# Patient Record
Sex: MALE | Race: OTHER | Employment: STUDENT | URBAN - METROPOLITAN AREA
[De-identification: names, ages, dates, MRNs, and addresses within clinical notes are randomized per-mention and may not be internally consistent; named-entity substitution may affect disease eponyms.]

---

## 2019-08-02 ENCOUNTER — HOSPITAL ENCOUNTER (OUTPATIENT)
Age: 24
Setting detail: OBSERVATION
Discharge: HOME | End: 2019-08-03
Attending: EMERGENCY MEDICINE | Admitting: SPECIALIST
Payer: MEDICAID

## 2019-08-02 DIAGNOSIS — I47.1 PAROXYSMAL SVT (SUPRAVENTRICULAR TACHYCARDIA) (HCC): Primary | ICD-10-CM

## 2019-08-02 LAB
ALBUMIN SERPL-MCNC: 3.8 G/DL (ref 3.5–5)
ALBUMIN/GLOB SERPL: 1.2 {RATIO} (ref 1.1–2.2)
ALP SERPL-CCNC: 436 U/L (ref 45–117)
ALT SERPL-CCNC: 38 U/L (ref 12–78)
ANION GAP SERPL CALC-SCNC: 7 MMOL/L (ref 5–15)
AST SERPL-CCNC: 26 U/L (ref 15–37)
ATRIAL RATE: 625 BPM
ATRIAL RATE: 91 BPM
BASOPHILS # BLD: 0 K/UL (ref 0–0.1)
BASOPHILS NFR BLD: 0 % (ref 0–1)
BILIRUB SERPL-MCNC: 0.9 MG/DL (ref 0.2–1)
BUN SERPL-MCNC: 21 MG/DL (ref 6–20)
BUN/CREAT SERPL: 19 (ref 12–20)
CALCIUM SERPL-MCNC: 9.2 MG/DL (ref 8.5–10.1)
CALCULATED P AXIS, ECG09: 62 DEGREES
CALCULATED R AXIS, ECG10: -41 DEGREES
CALCULATED R AXIS, ECG10: 84 DEGREES
CALCULATED T AXIS, ECG11: 1 DEGREES
CALCULATED T AXIS, ECG11: 108 DEGREES
CHLORIDE SERPL-SCNC: 103 MMOL/L (ref 97–108)
CO2 SERPL-SCNC: 27 MMOL/L (ref 21–32)
COMMENT, HOLDF: NORMAL
CREAT SERPL-MCNC: 1.08 MG/DL (ref 0.7–1.3)
DIAGNOSIS, 93000: NORMAL
DIAGNOSIS, 93000: NORMAL
DIFFERENTIAL METHOD BLD: NORMAL
EOSINOPHIL # BLD: 0 K/UL (ref 0–0.4)
EOSINOPHIL NFR BLD: 0 % (ref 0–7)
ERYTHROCYTE [DISTWIDTH] IN BLOOD BY AUTOMATED COUNT: 12.1 % (ref 11.5–14.5)
GLOBULIN SER CALC-MCNC: 3.1 G/DL (ref 2–4)
GLUCOSE SERPL-MCNC: 107 MG/DL (ref 65–100)
HCT VFR BLD AUTO: 49.7 % (ref 36.6–50.3)
HGB BLD-MCNC: 17 G/DL (ref 12.1–17)
IMM GRANULOCYTES # BLD AUTO: 0 K/UL (ref 0–0.04)
IMM GRANULOCYTES NFR BLD AUTO: 0 % (ref 0–0.5)
LYMPHOCYTES # BLD: 2 K/UL (ref 0.8–3.5)
LYMPHOCYTES NFR BLD: 22 % (ref 12–49)
MAGNESIUM SERPL-MCNC: 2.1 MG/DL (ref 1.6–2.4)
MCH RBC QN AUTO: 32.4 PG (ref 26–34)
MCHC RBC AUTO-ENTMCNC: 34.2 G/DL (ref 30–36.5)
MCV RBC AUTO: 94.7 FL (ref 80–99)
MONOCYTES # BLD: 0.7 K/UL (ref 0–1)
MONOCYTES NFR BLD: 7 % (ref 5–13)
NEUTS SEG # BLD: 6.3 K/UL (ref 1.8–8)
NEUTS SEG NFR BLD: 71 % (ref 32–75)
NRBC # BLD: 0 K/UL (ref 0–0.01)
NRBC BLD-RTO: 0 PER 100 WBC
P-R INTERVAL, ECG05: 162 MS
PLATELET # BLD AUTO: 205 K/UL (ref 150–400)
PMV BLD AUTO: 9.5 FL (ref 8.9–12.9)
POTASSIUM SERPL-SCNC: 4 MMOL/L (ref 3.5–5.1)
PROT SERPL-MCNC: 6.9 G/DL (ref 6.4–8.2)
Q-T INTERVAL, ECG07: 310 MS
Q-T INTERVAL, ECG07: 378 MS
QRS DURATION, ECG06: 134 MS
QRS DURATION, ECG06: 96 MS
QTC CALCULATION (BEZET), ECG08: 464 MS
QTC CALCULATION (BEZET), ECG08: 536 MS
RBC # BLD AUTO: 5.25 M/UL (ref 4.1–5.7)
SAMPLES BEING HELD,HOLD: NORMAL
SODIUM SERPL-SCNC: 137 MMOL/L (ref 136–145)
TROPONIN I SERPL-MCNC: 0.05 NG/ML
TROPONIN I SERPL-MCNC: 0.09 NG/ML
VENTRICULAR RATE, ECG03: 180 BPM
VENTRICULAR RATE, ECG03: 91 BPM
WBC # BLD AUTO: 9.1 K/UL (ref 4.1–11.1)

## 2019-08-02 PROCEDURE — 74011250636 HC RX REV CODE- 250/636: Performed by: EMERGENCY MEDICINE

## 2019-08-02 PROCEDURE — 96365 THER/PROPH/DIAG IV INF INIT: CPT

## 2019-08-02 PROCEDURE — 96361 HYDRATE IV INFUSION ADD-ON: CPT

## 2019-08-02 PROCEDURE — 85025 COMPLETE CBC W/AUTO DIFF WBC: CPT

## 2019-08-02 PROCEDURE — 99218 HC RM OBSERVATION: CPT

## 2019-08-02 PROCEDURE — 80053 COMPREHEN METABOLIC PANEL: CPT

## 2019-08-02 PROCEDURE — 74011000258 HC RX REV CODE- 258: Performed by: EMERGENCY MEDICINE

## 2019-08-02 PROCEDURE — 93005 ELECTROCARDIOGRAM TRACING: CPT

## 2019-08-02 PROCEDURE — 36415 COLL VENOUS BLD VENIPUNCTURE: CPT

## 2019-08-02 PROCEDURE — 84484 ASSAY OF TROPONIN QUANT: CPT

## 2019-08-02 PROCEDURE — 83735 ASSAY OF MAGNESIUM: CPT

## 2019-08-02 PROCEDURE — 77030022643 HC PAD DEFIB AD HRTSTRT PHL -B

## 2019-08-02 PROCEDURE — 74011250636 HC RX REV CODE- 250/636: Performed by: SPECIALIST

## 2019-08-02 PROCEDURE — 99285 EMERGENCY DEPT VISIT HI MDM: CPT

## 2019-08-02 RX ORDER — SODIUM CHLORIDE 9 MG/ML
75 INJECTION, SOLUTION INTRAVENOUS CONTINUOUS
Status: DISCONTINUED | OUTPATIENT
Start: 2019-08-02 | End: 2019-08-03 | Stop reason: HOSPADM

## 2019-08-02 RX ORDER — PROCAINAMIDE HYDROCHLORIDE 500 MG/ML
100 INJECTION INTRAMUSCULAR; INTRAVENOUS
Status: DISCONTINUED | OUTPATIENT
Start: 2019-08-02 | End: 2019-08-02 | Stop reason: ALTCHOICE

## 2019-08-02 RX ADMIN — SODIUM CHLORIDE 1000 ML: 900 INJECTION, SOLUTION INTRAVENOUS at 17:21

## 2019-08-02 RX ADMIN — SODIUM CHLORIDE 75 ML/HR: 900 INJECTION, SOLUTION INTRAVENOUS at 21:00

## 2019-08-02 RX ADMIN — DEXTROSE MONOHYDRATE 20 MG/MIN: 5 INJECTION, SOLUTION INTRAVENOUS at 18:14

## 2019-08-02 NOTE — ROUTINE PROCESS
TRANSFER - OUT REPORT: 
 
Verbal report given to Miguel Merrill RN (name) on Mark Gardner  being transferred to Room 458(unit) for routine progression of care Report consisted of patients Situation, Background, Assessment and  
Recommendations(SBAR). Information from the following report(s) SBAR, Kardex, ED Summary, MAR, Recent Results and Cardiac Rhythm NSR was reviewed with the receiving nurse. Lines:  
Peripheral IV 08/02/19 Left Antecubital (Active) Site Assessment Clean, dry, & intact 8/2/2019  5:01 PM  
Phlebitis Assessment 0 8/2/2019  5:01 PM  
Infiltration Assessment 0 8/2/2019  5:01 PM  
Dressing Status Clean, dry, & intact 8/2/2019  5:01 PM  
   
Peripheral IV 08/02/19 Right Antecubital (Active) Site Assessment Clean, dry, & intact 8/2/2019  5:01 PM  
Phlebitis Assessment 0 8/2/2019  5:01 PM  
Infiltration Assessment 0 8/2/2019  5:01 PM  
Dressing Status Clean, dry, & intact 8/2/2019  5:01 PM  
  
 
Opportunity for questions and clarification was provided. Patient transported with: 
 [facility]

## 2019-08-02 NOTE — PROGRESS NOTES
Admission Medication Reconciliation:    Information obtained from:  patient, chart review    Comments/Recommendations: All medications/allergies have been reviewed and updated. The patient denies taking any medications including prescription or over the counter. Insurance information confirmed that the patient has not filled any medications within the past 6 months. Changes made to Prior to Admission (PTA) Medication List:   ?   Medications Added:   - None   ? Medications Changed:   - None   ? Medications Removed:   - None       Significant PMH/Disease States:   History reviewed. No pertinent past medical history. Chief Complaint for this Admission:    Chief Complaint   Patient presents with    Irregular Heart Beat       Allergies:  Patient has no known allergies. Prior to Admission Medications:   None     Thank you for allowing pharmacy to participate in the coordination of this patient's care. If you have any other questions, please contact the medication reconciliation pharmacist at [phone]. Cait Rivero, Pharm. D., BCPS

## 2019-08-02 NOTE — H&P
Date of  Admission: 8/2/2019  4:49 PM     Holland Leahy is a 27 y.o. male admitted for psvt    Subjective: Patient with no remarkable past medical history presents to the emergency room with complaint of palpitations and mild shortness of breath and dizziness. This is the third episode of palpitations. The first episode occurred approximately 4 weeks ago. The first 2 episodes lasted approximately 4 to 5 hours. This has been ongoing since 8:00 this a.m. No presyncopal syncopal episodes are reported. The patient denies any illicit drug use or energy drinks use. He tells me there is no family history remarkable for early coronary events or sudden cardiac death. Assessment/Plan: PSVT. I suspect this is  PSVT with aberrancy and I suspect there is underlying Abbie-Parkinson-White syndrome. I cannot entirely be sure that he does not have any underlying atrial flutter at this time. For this reason we will proceed with procainamide intravenously. I have discussed with the patient and his mother potential side effects of recurrences with WPW. Risk of ventricular tachycardia ventricular fibrillation been discussed and possibility although remote of required defibrillation is been also discussed. Obtain electrolytes and replete as necessary keep potassium above 4 magnesium 2 and above. Admit to observation overnight. On account of potential for underlying atrial flutter associated with WPW we will consider discharging the patient on p.o. amiodarone temporarily. Obtain echo in am    As I have discussed with the patient and his mother you will likely need to proceed with ablation in the future. If the patient is stable in the morning with no recurrence of PSVT then will discharge home with follow-up with Dr. Missy Feliz electrophysiology. There are no active problems to display for this patient. No primary care provider on file. History reviewed. No pertinent past medical history.    History reviewed. No pertinent surgical history. No Known Allergies   History reviewed. No pertinent family history. Current Facility-Administered Medications   Medication Dose Route Frequency    procainamide (PRONESTYL) 1,000 mg in dextrose 5% 250 mL infusion  20 mg/min IntraVENous CONTINUOUS     No current outpatient medications on file. Review of Symptoms:  Pertinent items are noted in HPI. Physical Exam    Visit Vitals  /67   Pulse (!) 176   Temp 98.2 °F (36.8 °C)   Resp 26   Ht 5' 11\" (1.803 m)   Wt 183 lb 3.2 oz (83.1 kg)   SpO2 100%   BMI 25.55 kg/m²     Visit Vitals  /61   Pulse (!) 176   Temp 98.2 °F (36.8 °C)   Resp 30   Ht 5' 11\" (1.803 m)   Wt 183 lb 3.2 oz (83.1 kg)   SpO2 100%   BMI 25.55 kg/m²     General Appearance:  Well developed, well nourished,alert and oriented x 3, and individual in no acute distress. Ears/Nose/Mouth/Throat:   Hearing grossly normal.         Neck: Supple. Chest:   Lungs clear to auscultation bilaterally. Cardiovascular:  tachycardic S1, S2 normal, no murmur. Abdomen:   Soft, non-tender, bowel sounds are active. Extremities: No edema bilaterally. Skin: Warm and dry.                Cardiographics    Telemetry: SVT  ECG: SVT with likely aberrancy  Echocardiogram: Not done    Recent radiology, intake/output and wt reviewed    Labs:   Recent Results (from the past 24 hour(s))   EKG, 12 LEAD, INITIAL    Collection Time: 08/02/19  4:56 PM   Result Value Ref Range    Ventricular Rate 180 BPM    Atrial Rate 625 BPM    QRS Duration 134 ms    Q-T Interval 310 ms    QTC Calculation (Bezet) 536 ms    Calculated R Axis -41 degrees    Calculated T Axis 108 degrees    Diagnosis       Wide QRS tachycardia  Left axis deviation  Right bundle branch block  Left ventricular hypertrophy with repolarization abnormality  No previous ECGs available     CBC WITH AUTOMATED DIFF    Collection Time: 08/02/19  4:59 PM   Result Value Ref Range    WBC 9.1 4.1 - 11.1 K/uL    RBC 5. 25 4. 10 - 5.70 M/uL    HGB 17.0 12.1 - 17.0 g/dL    HCT 49.7 36.6 - 50.3 %    MCV 94.7 80.0 - 99.0 FL    MCH 32.4 26.0 - 34.0 PG    MCHC 34.2 30.0 - 36.5 g/dL    RDW 12.1 11.5 - 14.5 %    PLATELET 827 563 - 568 K/uL    MPV 9.5 8.9 - 12.9 FL    NRBC 0.0 0  WBC    ABSOLUTE NRBC 0.00 0.00 - 0.01 K/uL    NEUTROPHILS 71 32 - 75 %    LYMPHOCYTES 22 12 - 49 %    MONOCYTES 7 5 - 13 %    EOSINOPHILS 0 0 - 7 %    BASOPHILS 0 0 - 1 %    IMMATURE GRANULOCYTES 0 0.0 - 0.5 %    ABS. NEUTROPHILS 6.3 1.8 - 8.0 K/UL    ABS. LYMPHOCYTES 2.0 0.8 - 3.5 K/UL    ABS. MONOCYTES 0.7 0.0 - 1.0 K/UL    ABS. EOSINOPHILS 0.0 0.0 - 0.4 K/UL    ABS. BASOPHILS 0.0 0.0 - 0.1 K/UL    ABS. IMM. GRANS. 0.0 0.00 - 0.04 K/UL    DF AUTOMATED     SAMPLES BEING HELD    Collection Time: 08/02/19  4:59 PM   Result Value Ref Range    SAMPLES BEING HELD 1BLU 1RED     COMMENT        Add-on orders for these samples will be processed based on acceptable specimen integrity and analyte stability, which may vary by analyte.

## 2019-08-02 NOTE — ED PROVIDER NOTES
27 y.o. male with no significant past medical history who presents via EMS from [facility] with chief complaint of irregular heart beat. Patient reports onset this morning of palpitations and mild SOB, went to work but symptoms persisted so was seen at [facility]. At [facility] patient completed EKG which was concerning for WPW pattern, and was referred to [facility] ED for further evaluation. Patient presents to [facility] ED today with persisting palpitations and mild SOB, and upon examination has  bpm. Patient reports history of palpitations and SOB presented today 3x but states symptoms normally resolve with resting, noting his last episode of symptoms was a week ago. Patient admits to smoking marijuana for the first time four days ago. Patient denies any other drug use. Patient denies history of heart problems. Patient denies history of asthma or diabetes. Pt denies fever, chills, cough, congestion, chest pain, abdominal pain, nausea, vomiting, diarrhea, difficulty with urination or dysuria. There are no other acute medical concerns at this time. Note written by Trent Cantu, as dictated by Neha White MD 5:20 PM      The history is provided by the patient and the EMS personnel. History reviewed. No pertinent past medical history. History reviewed. No pertinent surgical history. History reviewed. No pertinent family history.     Social History     Socioeconomic History    Marital status: Not on file     Spouse name: Not on file    Number of children: Not on file    Years of education: Not on file    Highest education level: Not on file   Occupational History    Not on file   Social Needs    Financial resource strain: Not on file    Food insecurity:     Worry: Not on file     Inability: Not on file    Transportation needs:     Medical: Not on file     Non-medical: Not on file   Tobacco Use    Smoking status: Never Smoker    Smokeless tobacco: Never Used   Substance and Sexual Activity    Alcohol use: Yes     Comment: occasionally    Drug use: Yes     Types: Marijuana    Sexual activity: Not on file   Lifestyle    Physical activity:     Days per week: Not on file     Minutes per session: Not on file    Stress: Not on file   Relationships    Social connections:     Talks on phone: Not on file     Gets together: Not on file     Attends Zoroastrianism service: Not on file     Active member of club or organization: Not on file     Attends meetings of clubs or organizations: Not on file     Relationship status: Not on file    Intimate partner violence:     Fear of current or ex partner: Not on file     Emotionally abused: Not on file     Physically abused: Not on file     Forced sexual activity: Not on file   Other Topics Concern    Not on file   Social History Narrative    Not on file         ALLERGIES: Patient has no known allergies. Review of Systems   Constitutional: Negative for activity change, appetite change, chills, fatigue and fever. HENT: Negative for congestion, ear pain, facial swelling, sore throat and trouble swallowing. Eyes: Negative for pain, discharge and visual disturbance. Respiratory: Positive for shortness of breath. Negative for cough, chest tightness and wheezing. Cardiovascular: Positive for palpitations. Negative for chest pain. Gastrointestinal: Negative for abdominal pain, blood in stool, diarrhea, nausea and vomiting. Genitourinary: Negative for difficulty urinating, flank pain and hematuria. Musculoskeletal: Negative for arthralgias, joint swelling, myalgias and neck pain. Skin: Negative for color change and rash. Neurological: Negative for dizziness, weakness, numbness and headaches. Hematological: Negative for adenopathy. Does not bruise/bleed easily. Psychiatric/Behavioral: Negative for behavioral problems, confusion and sleep disturbance. All other systems reviewed and are negative.       Vitals:    08/02/19 1700 08/02/19 1702   BP: 105/55 105/55   Pulse: (!) 180 (!) 179   Resp: 23 20   Temp:  98.2 °F (36.8 °C)   SpO2:  100%   Weight:  83.1 kg (183 lb 3.2 oz)   Height:  5' 11\" (1.803 m)            Physical Exam   Constitutional: He is oriented to person, place, and time. He appears well-developed and well-nourished. No distress. HENT:   Head: Normocephalic and atraumatic. Nose: Nose normal.   Mouth/Throat: Oropharynx is clear and moist.   Eyes: Pupils are equal, round, and reactive to light. Conjunctivae and EOM are normal. No scleral icterus. Neck: Normal range of motion. Neck supple. No JVD present. No tracheal deviation present. No thyromegaly present. No carotid bruits noted. Cardiovascular: Regular rhythm and intact distal pulses. Tachycardia present. Exam reveals no gallop and no friction rub. No murmur heard. Pulmonary/Chest: Effort normal and breath sounds normal. No respiratory distress. He has no wheezes. He has no rales. He exhibits no tenderness. Abdominal: Soft. Bowel sounds are normal. He exhibits no distension and no mass. There is no tenderness. There is no rebound and no guarding. Musculoskeletal: Normal range of motion. He exhibits no edema or tenderness. Lymphadenopathy:     He has no cervical adenopathy. Neurological: He is alert and oriented to person, place, and time. He has normal reflexes. No cranial nerve deficit. Coordination normal.   Skin: Skin is warm and dry. No rash noted. No erythema. Psychiatric: He has a normal mood and affect. His behavior is normal. Judgment and thought content normal.   Nursing note and vitals reviewed.   Note written by Trent Wilder, as dictated by Shaylee Vega MD 5:20 PM      MDM  Number of Diagnoses or Management Options  Paroxysmal SVT (supraventricular tachycardia) [facility]): new and requires workup     Amount and/or Complexity of Data Reviewed  Clinical lab tests: ordered and reviewed  Decide to obtain previous medical records or to obtain history from someone other than the patient: yes  Review and summarize past medical records: yes  Discuss the patient with other providers: yes    Risk of Complications, Morbidity, and/or Mortality  Presenting problems: high  Diagnostic procedures: high  Management options: high    Critical Care  Total time providing critical care: (Total critical care time spent exclusive of procedures: 45 minutes)    Patient Progress  Patient progress: stable         Procedures   ED EKG interpretation 4:59 PM:  Rhythm: Supraventricular tachycardia; and regular . Rate (approx.): 180 bpm; Axis: left axis deviation; Cannot rule out atrial flutter. Widening QRS at 134 ms; ST/T wave: Nonspecific T wave change. Note written by Trent Braden, as dictated by Dago Kennedy MD 4:59 PM    CONSULT NOTE:  5:42 PM Dago Kennedy MD spoke with Dr. Koby Lee, Consult for Cardiology. Discussed available diagnostic tests and clinical findings. Dr. Koby Lee agrees with giving the patient procainamide. 5:52 PM Spoke with pharmacy who recommends dose of Procainamide 20 mg per minute. It will be titrated to widening of QRS, hypotension or resolution of the rhythm. 6:17 PM Dr. Koby Lee evaluated patient, still agrees with giving IV Procainamide, and will admit patient. Recommends starting on Amiodarone. PROGRESS NOTE:  6:27 PM Successfully converted to NSR after IV Procainamide. Pt HR 91 bpm and /78. Patient improved greatly with the medications. He remained in normal sinus rhythm in the ED. He is being admitted by Dr. Koby Lee for observation. ED EKG interpretation-repeat 6:31 PM:  Rhythm: normal sinus rhythm; and regular . Rate (approx.): 91 bpm; Axis: normal; ST/T wave: T wave inversion in leads 3 and 4. No ectopy. No clear ischemic change.    Note written by Trent Braden, as dictated by Dago Kennedy MD 6:31 PM    Patient tolerated the medications well and was discharged from the ED to the floor no acute distress.

## 2019-08-02 NOTE — ED TRIAGE NOTES
Patient comes to the ER from [facility] with HR at 185. Patient alert/oriented upon arrival.   +SOB  +Palpitations    Reports smoking marijuana Monday for the first time. Denies any other drug use. Report episode of heart palpitations last week but he went to sleep and the palpitations resolved.

## 2019-08-03 ENCOUNTER — APPOINTMENT (OUTPATIENT)
Dept: NON INVASIVE DIAGNOSTICS | Age: 24
End: 2019-08-03
Attending: SPECIALIST
Payer: MEDICAID

## 2019-08-03 ENCOUNTER — DOCUMENTATION ONLY (OUTPATIENT)
Dept: CARDIOLOGY CLINIC | Age: 24
End: 2019-08-03

## 2019-08-03 VITALS
HEIGHT: 71 IN | RESPIRATION RATE: 16 BRPM | HEART RATE: 73 BPM | DIASTOLIC BLOOD PRESSURE: 64 MMHG | WEIGHT: 185.85 LBS | BODY MASS INDEX: 26.02 KG/M2 | SYSTOLIC BLOOD PRESSURE: 112 MMHG | TEMPERATURE: 97.3 F | OXYGEN SATURATION: 100 %

## 2019-08-03 PROBLEM — I45.6 WPW (WOLFF-PARKINSON-WHITE SYNDROME): Status: ACTIVE | Noted: 2019-08-03

## 2019-08-03 LAB
ANION GAP SERPL CALC-SCNC: 7 MMOL/L (ref 5–15)
BUN SERPL-MCNC: 13 MG/DL (ref 6–20)
BUN/CREAT SERPL: 17 (ref 12–20)
CALCIUM SERPL-MCNC: 7.9 MG/DL (ref 8.5–10.1)
CHLORIDE SERPL-SCNC: 109 MMOL/L (ref 97–108)
CO2 SERPL-SCNC: 24 MMOL/L (ref 21–32)
CREAT SERPL-MCNC: 0.78 MG/DL (ref 0.7–1.3)
ECHO AO ROOT DIAM: 3.71 CM
ECHO AV AREA PEAK VELOCITY: 2.8 CM2
ECHO AV PEAK GRADIENT: 4.1 MMHG
ECHO AV PEAK VELOCITY: 101.74 CM/S
ECHO LA AREA 4C: 18.5 CM2
ECHO LA MAJOR AXIS: 3.18 CM
ECHO LA TO AORTIC ROOT RATIO: 0.86
ECHO LA VOL 2C: 51.13 ML (ref 18–58)
ECHO LA VOL 4C: 46.36 ML (ref 18–58)
ECHO LA VOL BP: 70.02 ML (ref 18–58)
ECHO LA VOL/BSA BIPLANE: 34.25 ML/M2 (ref 16–28)
ECHO LA VOLUME INDEX A2C: 25.01 ML/M2 (ref 16–28)
ECHO LA VOLUME INDEX A4C: 22.68 ML/M2 (ref 16–28)
ECHO LV E' LATERAL VELOCITY: 17 CM/S
ECHO LV E' SEPTAL VELOCITY: 11.73 CM/S
ECHO LV INTERNAL DIMENSION DIASTOLIC: 4.65 CM (ref 4.2–5.9)
ECHO LV INTERNAL DIMENSION SYSTOLIC: 3.1 CM
ECHO LV IVSD: 0.98 CM (ref 0.6–1)
ECHO LV MASS 2D: 179.9 G (ref 88–224)
ECHO LV MASS INDEX 2D: 88 G/M2 (ref 49–115)
ECHO LV POSTERIOR WALL DIASTOLIC: 0.96 CM (ref 0.6–1)
ECHO LVOT DIAM: 1.98 CM
ECHO LVOT PEAK GRADIENT: 3.3 MMHG
ECHO LVOT PEAK VELOCITY: 90.78 CM/S
ECHO MV A VELOCITY: 48.85 CM/S
ECHO MV AREA PHT: 6.4 CM2
ECHO MV E DECELERATION TIME (DT): 119.4 MS
ECHO MV E VELOCITY: 77.68 CM/S
ECHO MV E/A RATIO: 1.59
ECHO MV E/E' LATERAL: 4.57
ECHO MV E/E' RATIO (AVERAGED): 5.6
ECHO MV E/E' SEPTAL: 6.62
ECHO MV PRESSURE HALF TIME (PHT): 34.6 MS
ECHO PV MAX VELOCITY: 79.15 CM/S
ECHO PV PEAK GRADIENT: 2.5 MMHG
ECHO RV TAPSE: 2.04 CM (ref 1.5–2)
ECHO TV REGURGITANT MAX VELOCITY: 174.71 CM/S
ECHO TV REGURGITANT PEAK GRADIENT: 12.2 MMHG
GLUCOSE SERPL-MCNC: 92 MG/DL (ref 65–100)
MAGNESIUM SERPL-MCNC: 2 MG/DL (ref 1.6–2.4)
POTASSIUM SERPL-SCNC: 4.2 MMOL/L (ref 3.5–5.1)
SODIUM SERPL-SCNC: 140 MMOL/L (ref 136–145)
TROPONIN I SERPL-MCNC: 0.06 NG/ML
TSH SERPL DL<=0.05 MIU/L-ACNC: 2.08 UIU/ML (ref 0.36–3.74)

## 2019-08-03 PROCEDURE — 80048 BASIC METABOLIC PNL TOTAL CA: CPT

## 2019-08-03 PROCEDURE — 84484 ASSAY OF TROPONIN QUANT: CPT

## 2019-08-03 PROCEDURE — 74011250636 HC RX REV CODE- 250/636: Performed by: SPECIALIST

## 2019-08-03 PROCEDURE — 84443 ASSAY THYROID STIM HORMONE: CPT

## 2019-08-03 PROCEDURE — 36415 COLL VENOUS BLD VENIPUNCTURE: CPT

## 2019-08-03 PROCEDURE — 83735 ASSAY OF MAGNESIUM: CPT

## 2019-08-03 PROCEDURE — 93306 TTE W/DOPPLER COMPLETE: CPT

## 2019-08-03 PROCEDURE — 99218 HC RM OBSERVATION: CPT

## 2019-08-03 RX ORDER — AMIODARONE HYDROCHLORIDE 200 MG/1
200 TABLET ORAL 2 TIMES DAILY
Status: DISCONTINUED | OUTPATIENT
Start: 2019-08-03 | End: 2019-08-03 | Stop reason: HOSPADM

## 2019-08-03 RX ORDER — AMIODARONE HYDROCHLORIDE 200 MG/1
200 TABLET ORAL 2 TIMES DAILY
Qty: 60 TAB | Refills: 3 | Status: SHIPPED | OUTPATIENT
Start: 2019-08-03 | End: 2019-09-04 | Stop reason: SDUPTHER

## 2019-08-03 RX ADMIN — SODIUM CHLORIDE 75 ML/HR: 900 INJECTION, SOLUTION INTRAVENOUS at 10:30

## 2019-08-03 NOTE — DISCHARGE SUMMARY
Cardiology Discharge Summary     Patient ID:  Barbara Christie  [id number]  55 y.o.  1995    Admit Date: 8/2/2019    Discharge Date: 8/3/2019     Admitting Physician: Candelaria Barger MD     Discharge Physician: Brittney Sawant MD    Admission Diagnoses: PSVT (paroxysmal supraventricular tachycardia) [facility]) [I47.1]    Discharge Diagnoses: Active Problems:    PSVT (paroxysmal supraventricular tachycardia) ([address] (8/2/2019)      WPW (Abbie-Parkinson-White syndrome) (8/3/2019)        Discharge Condition: Good    Cardiology Procedures this Admission:  EchoCardiogram    Hospital Course: Admitted with SVT with aberrancy thought to be due to WPW syndrome, converted with IV procainamide. Started on amiodarone, told to see Dr. Larissa King for possible ablation of accessory pathway. Consults: None    Discharge Exam:     Visit Vitals  /64 (BP 1 Location: Left arm, BP Patient Position: At rest)   Pulse 73   Temp 97.3 °F (36.3 °C)   Resp 16   Ht 5' 11\" (1.803 m)   Wt 84.3 kg (185 lb 13.6 oz)   SpO2 100%   BMI 25.92 kg/m²     General Appearance:  Well developed, well nourished,alert and oriented x 3,  individual in no acute distress. Ears/Nose/Mouth/Throat:   Hearing grossly normal.         Neck: Supple. Chest:   Lungs clear to auscultation bilaterally. Cardiovascular:  Regular rate and rhythm, S1, S2 normal, no murmur. Abdomen:   Soft, non-tender, bowel sounds are active. Extremities: No edema bilaterally. Skin: Warm and dry. Disposition: home    Quality Care Documentation          Referenced discharge instructions provided by nursing for diet and activity. Follow-up with Dr. Larissa King.      Signed:  Brittney Sawant MD  8/3/2019  2:24 PM  [phone]  Cell

## 2019-08-03 NOTE — PROGRESS NOTES
0730  Bedside shift change report given to Nayla Fontenot  (oncoming nurse) by Jamie Neff RN  (offgoing nurse). Report included the following information SBAR, Kardex, Procedure Summary, Intake/Output, MAR, Recent Results and Med Rec Status. 0830 Echo in room. 0 Educated patient and parents on discharge, medication, and follow up instructions. Time given for questions. IV and telemetry removed. Family packed room. Problem: Falls - Risk of  Goal: *Absence of Falls  Description  Document Meghan Oseguera Fall Risk and appropriate interventions in the flowsheet.   Outcome: Progressing Towards Goal  Note:   Fall Risk Interventions:     Problem: Patient Education: Go to Patient Education Activity  Goal: Patient/Family Education  Outcome: Progressing Towards Goal     Problem: Pain  Goal: *Control of Pain  Outcome: Progressing Towards Goal     Problem: Syncope  Goal: *Absence of injury  Outcome: Progressing Towards Goal  Goal: Decrease or eliminate episodes of syncope  Outcome: Progressing Towards Goal

## 2019-08-03 NOTE — PROGRESS NOTES
SHAMIR: PSVT, (paroxysmal supraventricular tachycardia)    Reason for Admission:  PSVT  No PMH                    RRAT Score:  0                   Plan for utilizing home health:    none                      Current Advanced Directive/Advance Care Plan:  Does not have                         Transition of Care Plan: Pt lives with parents. He is a full time student. He does not have insurance. CM gave pt information to contact [employer] and an application for BS care card if he is eligible to apply. Pt will return home and f/u with [facility] in [address] [place] [address]. Care Management Interventions  PCP Verified by CM: Yes([facility], [place] location)  Last Visit to PCP: 08/02/19  Palliative Care Criteria Met (RRAT>21 & CHF Dx)?: No  Mode of Transport at Discharge: Other (see comment)(father)  Physical Therapy Consult: No  Occupational Therapy Consult: No  Current Support Network: Other, Relative's Home(lives with his father, Lem Epley, [phone])  Confirm Follow Up Transport: Family  Plan discussed with Pt/Family/Caregiver: Yes     Observation notice provided in writing to patient and/or caregiver as well as verbal explanation of the policy. Patients who are in outpatient status also receive the Observation notice.       Evelina Dumont RN ACM CRM

## 2019-08-03 NOTE — PROGRESS NOTES
1930: Bedside and Verbal shift change report given to Laith Ahumada RN (oncoming nurse) by Miguel Merrill RN (offgoing nurse). Report included the following information SBAR, Kardex, Intake/Output, MAR, Recent Results and Cardiac Rhythm NSR.   0730: Bedside and Verbal shift change report given to Miguel Merrill RN (oncoming nurse) by Laith Ahumada RN (offgoing nurse). Report included the following information SBAR, Kardex, Intake/Output, MAR, Recent Results and Cardiac Rhythm NSR.

## 2019-08-05 ENCOUNTER — TELEPHONE (OUTPATIENT)
Dept: CARDIOLOGY CLINIC | Age: 24
End: 2019-08-05

## 2019-08-05 NOTE — TELEPHONE ENCOUNTER
----- Message from Nia Johansen MD sent at 8/3/2019  7:10 PM EDT -----  He has WPWS and needs ablation  Can you call and make appt   He was discharged 8/3 on amiodarone

## 2019-08-05 NOTE — TELEPHONE ENCOUNTER
Patient currently not registered in [place].  Will ask PSR to contact and schedule new patient appointment with Dr. Kris Giordano

## 2019-08-05 NOTE — TELEPHONE ENCOUNTER
Fermin Ballard PSR \"Mr. Klaus Ozuna has been scheduled with Dr. Evin Park for September 3rd at 10am\"

## 2019-09-03 ENCOUNTER — OFFICE VISIT (OUTPATIENT)
Dept: CARDIOLOGY CLINIC | Age: 24
End: 2019-09-03

## 2019-09-03 VITALS
SYSTOLIC BLOOD PRESSURE: 116 MMHG | DIASTOLIC BLOOD PRESSURE: 78 MMHG | BODY MASS INDEX: 25.62 KG/M2 | WEIGHT: 183 LBS | RESPIRATION RATE: 14 BRPM | HEART RATE: 78 BPM | HEIGHT: 71 IN | OXYGEN SATURATION: 98 %

## 2019-09-03 DIAGNOSIS — I45.6 WPW (WOLFF-PARKINSON-WHITE SYNDROME): ICD-10-CM

## 2019-09-03 DIAGNOSIS — I47.1 PSVT (PAROXYSMAL SUPRAVENTRICULAR TACHYCARDIA) (HCC): Primary | ICD-10-CM

## 2019-09-03 NOTE — PROGRESS NOTES
Cardiac Electrophysiology OFFICE Consultation Note     Subjective:      Valeria Bardales is a 27 y.o. patient who is seen for evaluation of  WPW syndrome  He was at [facility] ER 8/2/2019 with the third episode of  bpm  He had antidromic SVT  Procainamide IV given and converted  He stayed overnight  Echo with normal LVEF, mild MR, TR  He has no family hx of SVT or sudden death    He was given amiodarone 200 mg every day  He works at [employer]    Patient Active Problem List   Diagnosis Code    PSVT (paroxysmal supraventricular tachycardia) ([facility]) I47.1    WPW (Abbie-Parkinson-White syndrome) I45.6     Current Outpatient Medications   Medication Sig Dispense Refill    amiodarone (CORDARONE) 200 mg tablet Take 1 Tab by mouth two (2) times a day. 60 Tab 3     No Known Allergies  PSVT:  past medical history. History reviewed. No past surgical history. History reviewed. No sudden death or SVT in family history. Social History     Tobacco Use    Smoking status: Never Smoker    Smokeless tobacco: Never Used   Substance Use Topics    Alcohol use: Yes     Comment: occasionally        Review of Systems:   Constitutional: Negative for fever, chills, weight loss, malaise/fatigue. HEENT: Negative for nosebleeds, vision changes. Respiratory: Negative for cough, hemoptysis  Cardiovascular: Negative for chest pain, + palpitations, no orthopnea, claudication, leg swelling, syncope, and PND. Gastrointestinal: Negative for nausea, vomiting, diarrhea, blood in stool and melena. Genitourinary: Negative for dysuria, and hematuria. Musculoskeletal: Negative for myalgias, arthralgia. Skin: Negative for rash. Heme: Does not bleed or bruise easily.    Neurological: Negative for speech change and focal weakness     Objective:     Visit Vitals  /78 (BP 1 Location: Left arm, BP Patient Position: Sitting)   Pulse 78   Resp 14   Ht 5' 11\" (1.803 m)   Wt 183 lb (83 kg)   SpO2 98%   BMI 25.52 kg/m² Physical Exam:   Constitutional: well-developed and well-nourished. No respiratory distress. Head: Normocephalic and atraumatic. Eyes: Pupils are equal, round  ENT: hearing normal  Neck: supple. No JVD present. Cardiovascular: Normal rate, regular rhythm. Exam reveals no gallop and no friction rub. No murmur heard. Pulmonary/Chest: Effort normal and breath sounds normal. No wheezes. Abdominal: Soft, no tenderness. Musculoskeletal: no edema. Neurological: alert,oriented. Skin: Skin is warm and dry  Psychiatric: normal mood and affect. Behavior is normal. Judgment and thought content normal.           Assessment/Plan:       ICD-10-CM ICD-9-CM    1. PSVT (paroxysmal supraventricular tachycardia) (HCC) I47.1 427.0    2. WPW (Abbie-Parkinson-White syndrome) I45.6 426.7      reviewed diet, exercise and weight control  reviewed medications and side effects in detail   He has read up about amiodarone side effects and does not want to take it  He is thinking and wants to talk to family about EP study and ablation  He will call back  Risks include but are not limited to bleeding in the groin, infection in the groin and/or heart valves, fistula between the groin arteries and veins, valvular damage, diaphragmatic paralysis, aortic dissection, heart attack, stroke, blood clot in the leg, pulmonary embolism, lung collapse (pneumothorax or hemothorax), heart collapse (pericardial tamponade), death. The added risks for left sided ablation may be kidney failure (from contrast injection), higher risk of bleeding, stroke and heart attack. Elective or emergency surgery may be required to repair some of these complications. Prolonged hospitalization would be required. General anesthesia and transeptal catheterization may be required for the procedure  Thank you for involving me in this patient's care and please call with further concerns or questions. Katelin Sabillon M.D.   Electrophysiology/Cardiology  [facility]  [address]                             [phone]

## 2019-09-04 ENCOUNTER — TELEPHONE (OUTPATIENT)
Dept: CARDIOLOGY CLINIC | Age: 24
End: 2019-09-04

## 2019-09-04 ENCOUNTER — DOCUMENTATION ONLY (OUTPATIENT)
Dept: CARDIOLOGY CLINIC | Age: 24
End: 2019-09-04

## 2019-09-04 RX ORDER — AMIODARONE HYDROCHLORIDE 200 MG/1
200 TABLET ORAL 2 TIMES DAILY
Qty: 60 TAB | Refills: 3 | Status: SHIPPED | OUTPATIENT
Start: 2019-09-04 | End: 2019-09-05 | Stop reason: SDUPTHER

## 2019-09-04 NOTE — PROGRESS NOTES
I had recommended 400 mg bid for 2 weeks then 200 mg bid 2 weeks and then 200 mg every day  His ablation procedure is to move up if he has to come to ER (if 400 mg amiodarone loading does not stop his WPW/SVT)

## 2019-09-04 NOTE — TELEPHONE ENCOUNTER
Verified patient with two types of identifiers. Received call from patient's dad who states that patient called him and said he is currently in an episodes of SVT. Notified Devora I will call patient now to assess further. Verified patient with two types of identifiers. Spoke with patient who states that he was at the gym and he went into SVT. Patient states he came home and got in a cold shower to try and break the episode. Patient states he tried all his normal things to break the episode but was unable. Patient does not know how fast his heart is going but can feel it racing. Patient admits to never starting Amiodarone. Patient states he never picked it up from the Pharmacy. VO per Dr. Katty Stephenson patient to take Amiodarone 400 mg now to break the episode. Instructed patient not to drive to pharmacy during current episode. Patient states mother is with him and will drive. Notified patient I have held the date 10/7/19 at 1:00 pm for his procedure. Notified patient we will discuss details after current episode breaks. Patient verbalized understanding and will call with any other questions.

## 2019-09-04 NOTE — TELEPHONE ENCOUNTER
Patient is calling to schedule an ablation. He also would like to know what is the best thing for him to do when he has an episode of SVT. Please advise.     Phone #: [phone]  Thanks

## 2019-09-05 ENCOUNTER — TELEPHONE (OUTPATIENT)
Dept: CARDIOLOGY CLINIC | Age: 24
End: 2019-09-05

## 2019-09-05 DIAGNOSIS — I47.1 PSVT (PAROXYSMAL SUPRAVENTRICULAR TACHYCARDIA) (HCC): ICD-10-CM

## 2019-09-05 DIAGNOSIS — I45.6 WPW (WOLFF-PARKINSON-WHITE SYNDROME): ICD-10-CM

## 2019-09-05 DIAGNOSIS — Z01.812 PRE-PROCEDURE LAB EXAM: Primary | ICD-10-CM

## 2019-09-05 RX ORDER — AMIODARONE HYDROCHLORIDE 200 MG/1
200 TABLET ORAL 2 TIMES DAILY
Qty: 100 TAB | Refills: 0 | Status: SHIPPED | OUTPATIENT
Start: 2019-09-05

## 2019-09-05 NOTE — LETTER
9/5/2019 12:44 PM 
 
Mr. Mami Drake 
[address] [facility] [address] Your EP study with possible WPW ablation procedure has been scheduled for 10/7/19 at 1:00 pm, at [facility]. 
 
Please report to Admitting Department by 11:00 am, or 2 hours prior to your scheduled procedure. Please bring a list of your current medications and medication bottles, if able, to the hospital on this day. You will be unable to drive after your procedure so please make sure to bring someone with you to your procedure. You will need to have nothing to eat or drink after midnight, the night prior to your procedure. You may have small sips of water, if needed, to take with your medication. You will need labs drawn prior to your procedure. Please go to [facility] to have this done as soon as you are able. You should stop your medication, Amiodarone, 7 days prior to your scheduled procedure. After your procedure, you will need to follow up with Dr. Ld Mendoza.  Your follow-up appointment has been scheduled for 10/22/19 at 2:20 pm.  
 
 
 
Sincerely, 
 
 
Josesito Young MD

## 2019-09-05 NOTE — TELEPHONE ENCOUNTER
Verified patient with two types of identifiers. Patient states that he feels better now. Patient states his episodes broke a couple hours after taking the Amiodarone. Notified patient that per Dr. Cuong Redi patient to take Amiodarone 400 mg BID for 2 weeks then decrease to 200 mg BID then decrease to 20 mg daily. Patient scheduled for EPS & WPW ablation on 10/7/19 @ 1:00 pm. Reviewed pre-procedure instructions. Notified patient I will send pre-procedure instructions and lab slips in the mail. Patient to call with any questions. Patient verbalized understanding and will call with any other questions.

## 2019-09-05 NOTE — LETTER
9/5/2019 12:51 PM 
 
Mr. Mundo Duque 
[address] [facility] [address] Please take the Amiodarone 400 mg twice daily for 2 weeks (start day 9/4/19) then decrease to 200 mg twice daily for 2 weeks then decrease to 200 mg daily. Your EP study with possible WPW ablation procedure has been scheduled for 10/7/19 at 1:00 pm, at [facility]. 
 
Please report to Admitting Department by 11:00 am, or 2 hours prior to your scheduled procedure. Please bring a list of your current medications and medication bottles, if able, to the hospital on this day. You will be unable to drive after your procedure so please make sure to bring someone with you to your procedure. You will need to have nothing to eat or drink after midnight, the night prior to your procedure. You may have small sips of water, if needed, to take with your medication. You will need labs drawn prior to your procedure. Please go to [facility] to have this done as soon as you are able. You should stop your medication, Amiodarone, 7 days prior to your scheduled procedure. After your procedure, you will need to follow up with Dr. Omid Posey.  Your follow-up appointment has been scheduled for 10/22/19 at 2:20 pm.  
 
 
 
Sincerely, 
 
 
Emmett Emmanuel MD

## 2019-09-07 ENCOUNTER — DOCUMENTATION ONLY (OUTPATIENT)
Dept: CARDIOLOGY CLINIC | Age: 24
End: 2019-09-07

## 2019-09-07 NOTE — PROGRESS NOTES
My nurse asked about cost of procedure since he does not have insurance  I recommend him contact business office and try to apply for assistance ex Care Card if still available

## 2019-09-11 NOTE — PROGRESS NOTES
Verified patient with two types of identifiers. Notified patient that per our financial team the estimated responsibility will be $24,062.45. We have a self-pay team that can contact him and go over the different options. Notified patient he can also apply for a Financial Assistance Program. Patient verbalized understanding and will call with any other questions.

## 2019-09-18 ENCOUNTER — TELEPHONE (OUTPATIENT)
Dept: CARDIOLOGY CLINIC | Age: 24
End: 2019-09-18

## 2019-09-18 NOTE — TELEPHONE ENCOUNTER
Patient stated that he would like to cancel his procedure scheduled for 10/7/19. Please advise.     Phone #: [phone]  Thanks

## 2019-09-18 NOTE — TELEPHONE ENCOUNTER
Called Pt. Two patient identifiers confirmed. Pt stated he is going to get a second opinion about procedure. Will call back if he wants to reschedule.

## 2023-10-03 ENCOUNTER — APPOINTMENT (RX ONLY)
Dept: URBAN - METROPOLITAN AREA CLINIC 138 | Facility: CLINIC | Age: 28
Setting detail: DERMATOLOGY
End: 2023-10-03

## 2023-10-03 DIAGNOSIS — L70.0 ACNE VULGARIS: ICD-10-CM

## 2023-10-03 PROCEDURE — 99203 OFFICE O/P NEW LOW 30 MIN: CPT

## 2023-10-03 PROCEDURE — ? COUNSELING

## 2023-10-03 PROCEDURE — ? PRESCRIPTION

## 2023-10-03 RX ORDER — TRETIONIN 0.5 MG/G
CREAM TOPICAL
Qty: 20 | Refills: 1 | Status: ERX | COMMUNITY
Start: 2023-10-03

## 2023-10-03 RX ORDER — DOXYCYCLINE HYCLATE 50 MG/1
CAPSULE, GELATIN COATED ORAL
Qty: 30 | Refills: 0 | Status: ERX | COMMUNITY
Start: 2023-10-03

## 2023-10-03 RX ADMIN — TRETIONIN: 0.5 CREAM TOPICAL at 00:00

## 2023-10-03 RX ADMIN — DOXYCYCLINE HYCLATE: 50 CAPSULE, GELATIN COATED ORAL at 00:00

## 2023-10-03 ASSESSMENT — LOCATION DETAILED DESCRIPTION DERM: LOCATION DETAILED: NASAL DORSUM

## 2023-10-03 ASSESSMENT — LOCATION SIMPLE DESCRIPTION DERM: LOCATION SIMPLE: NOSE

## 2023-10-03 ASSESSMENT — LOCATION ZONE DERM: LOCATION ZONE: NOSE

## 2023-10-03 NOTE — PROCEDURE: COUNSELING
Tetracycline Pregnancy And Lactation Text: This medication is Pregnancy Category D and not consider safe during pregnancy. It is also excreted in breast milk.
Minocycline Counseling: Patient advised regarding possible photosensitivity and discoloration of the teeth, skin, lips, tongue and gums.  Patient instructed to avoid sunlight, if possible.  When exposed to sunlight, patients should wear protective clothing, sunglasses, and sunscreen.  The patient was instructed to call the office immediately if the following severe adverse effects occur:  hearing changes, easy bruising/bleeding, severe headache, or vision changes.  The patient verbalized understanding of the proper use and possible adverse effects of minocycline.  All of the patient's questions and concerns were addressed.
Dapsone Pregnancy And Lactation Text: This medication is Pregnancy Category C and is not considered safe during pregnancy or breast feeding.
Azithromycin Counseling:  I discussed with the patient the risks of azithromycin including but not limited to GI upset, allergic reaction, drug rash, diarrhea, and yeast infections.
High Dose Vitamin A Counseling: Side effects reviewed, pt to contact office should one occur.
Topical Retinoid counseling:  Patient advised to apply a pea-sized amount only at bedtime and wait 30 minutes after washing their face before applying.  If too drying, patient may add a non-comedogenic moisturizer. The patient verbalized understanding of the proper use and possible adverse effects of retinoids.  All of the patient's questions and concerns were addressed.
Topical Sulfur Applications Pregnancy And Lactation Text: This medication is Pregnancy Category C and has an unknown safety profile during pregnancy. It is unknown if this topical medication is excreted in breast milk.
Spironolactone Pregnancy And Lactation Text: This medication can cause feminization of the male fetus and should be avoided during pregnancy. The active metabolite is also found in breast milk.
Benzoyl Peroxide Counseling: Patient counseled that medicine may cause skin irritation and bleach clothing.  In the event of skin irritation, the patient was advised to reduce the amount of the drug applied or use it less frequently.   The patient verbalized understanding of the proper use and possible adverse effects of benzoyl peroxide.  All of the patient's questions and concerns were addressed.
Isotretinoin Counseling: Patient should get monthly blood tests, not donate blood, not drive at night if vision affected, not share medication, and not undergo elective surgery for 6 months after tx completed. Side effects reviewed, pt to contact office should one occur.
Birth Control Pills Pregnancy And Lactation Text: This medication should be avoided if pregnant and for the first 30 days post-partum.
Topical Clindamycin Pregnancy And Lactation Text: This medication is Pregnancy Category B and is considered safe during pregnancy. It is unknown if it is excreted in breast milk.
Bactrim Pregnancy And Lactation Text: This medication is Pregnancy Category D and is known to cause fetal risk.  It is also excreted in breast milk.
Azelaic Acid Counseling: Patient counseled that medicine may cause skin irritation and to avoid applying near the eyes.  In the event of skin irritation, the patient was advised to reduce the amount of the drug applied or use it less frequently.   The patient verbalized understanding of the proper use and possible adverse effects of azelaic acid.  All of the patient's questions and concerns were addressed.
Tazorac Pregnancy And Lactation Text: This medication is not safe during pregnancy. It is unknown if this medication is excreted in breast milk.
Include Pregnancy/Lactation Warning?: No
Erythromycin Counseling:  I discussed with the patient the risks of erythromycin including but not limited to GI upset, allergic reaction, drug rash, diarrhea, increase in liver enzymes, and yeast infections.
Detail Level: Detailed
Aklief counseling:  Patient advised to apply a pea-sized amount only at bedtime and wait 30 minutes after washing their face before applying.  If too drying, patient may add a non-comedogenic moisturizer.  The most commonly reported side effects including irritation, redness, scaling, dryness, stinging, burning, itching, and increased risk of sunburn.  The patient verbalized understanding of the proper use and possible adverse effects of retinoids.  All of the patient's questions and concerns were addressed.
Azithromycin Pregnancy And Lactation Text: This medication is considered safe during pregnancy and is also secreted in breast milk.
Doxycycline Counseling:  Patient counseled regarding possible photosensitivity and increased risk for sunburn.  Patient instructed to avoid sunlight, if possible.  When exposed to sunlight, patients should wear protective clothing, sunglasses, and sunscreen.  The patient was instructed to call the office immediately if the following severe adverse effects occur:  hearing changes, easy bruising/bleeding, severe headache, or vision changes.  The patient verbalized understanding of the proper use and possible adverse effects of doxycycline.  All of the patient's questions and concerns were addressed.
Topical Retinoid Pregnancy And Lactation Text: This medication is Pregnancy Category C. It is unknown if this medication is excreted in breast milk.
Winlevi Counseling:  I discussed with the patient the risks of topical clascoterone including but not limited to erythema, scaling, itching, and stinging. Patient voiced their understanding.
Tetracycline Counseling: Patient counseled regarding possible photosensitivity and increased risk for sunburn.  Patient instructed to avoid sunlight, if possible.  When exposed to sunlight, patients should wear protective clothing, sunglasses, and sunscreen.  The patient was instructed to call the office immediately if the following severe adverse effects occur:  hearing changes, easy bruising/bleeding, severe headache, or vision changes.  The patient verbalized understanding of the proper use and possible adverse effects of tetracycline.  All of the patient's questions and concerns were addressed. Patient understands to avoid pregnancy while on therapy due to potential birth defects.
High Dose Vitamin A Pregnancy And Lactation Text: High dose vitamin A therapy is contraindicated during pregnancy and breast feeding.
Benzoyl Peroxide Pregnancy And Lactation Text: This medication is Pregnancy Category C. It is unknown if benzoyl peroxide is excreted in breast milk.
Isotretinoin Pregnancy And Lactation Text: This medication is Pregnancy Category X and is considered extremely dangerous during pregnancy. It is unknown if it is excreted in breast milk.
Topical Sulfur Applications Counseling: Topical Sulfur Counseling: Patient counseled that this medication may cause skin irritation or allergic reactions.  In the event of skin irritation, the patient was advised to reduce the amount of the drug applied or use it less frequently.   The patient verbalized understanding of the proper use and possible adverse effects of topical sulfur application.  All of the patient's questions and concerns were addressed.
Dapsone Counseling: I discussed with the patient the risks of dapsone including but not limited to hemolytic anemia, agranulocytosis, rashes, methemoglobinemia, kidney failure, peripheral neuropathy, headaches, GI upset, and liver toxicity.  Patients who start dapsone require monitoring including baseline LFTs and weekly CBCs for the first month, then every month thereafter.  The patient verbalized understanding of the proper use and possible adverse effects of dapsone.  All of the patient's questions and concerns were addressed.
Birth Control Pills Counseling: Birth Control Pill Counseling: I discussed with the patient the potential side effects of OCPs including but not limited to increased risk of stroke, heart attack, thrombophlebitis, deep venous thrombosis, hepatic adenomas, breast changes, GI upset, headaches, and depression.  The patient verbalized understanding of the proper use and possible adverse effects of OCPs. All of the patient's questions and concerns were addressed.
Spironolactone Counseling: Patient advised regarding risks of diarrhea, abdominal pain, hyperkalemia, birth defects (for female patients), liver toxicity and renal toxicity. The patient may need blood work to monitor liver and kidney function and potassium levels while on therapy. The patient verbalized understanding of the proper use and possible adverse effects of spironolactone.  All of the patient's questions and concerns were addressed.
Azelaic Acid Pregnancy And Lactation Text: This medication is considered safe during pregnancy and breast feeding.
Topical Clindamycin Counseling: Patient counseled that this medication may cause skin irritation or allergic reactions.  In the event of skin irritation, the patient was advised to reduce the amount of the drug applied or use it less frequently.   The patient verbalized understanding of the proper use and possible adverse effects of clindamycin.  All of the patient's questions and concerns were addressed.
Erythromycin Pregnancy And Lactation Text: This medication is Pregnancy Category B and is considered safe during pregnancy. It is also excreted in breast milk.
Sarecycline Counseling: Patient advised regarding possible photosensitivity and discoloration of the teeth, skin, lips, tongue and gums.  Patient instructed to avoid sunlight, if possible.  When exposed to sunlight, patients should wear protective clothing, sunglasses, and sunscreen.  The patient was instructed to call the office immediately if the following severe adverse effects occur:  hearing changes, easy bruising/bleeding, severe headache, or vision changes.  The patient verbalized understanding of the proper use and possible adverse effects of sarecycline.  All of the patient's questions and concerns were addressed.
Winlevi Pregnancy And Lactation Text: This medication is considered safe during pregnancy and breastfeeding.
Bactrim Counseling:  I discussed with the patient the risks of sulfa antibiotics including but not limited to GI upset, allergic reaction, drug rash, diarrhea, dizziness, photosensitivity, and yeast infections.  Rarely, more serious reactions can occur including but not limited to aplastic anemia, agranulocytosis, methemoglobinemia, blood dyscrasias, liver or kidney failure, lung infiltrates or desquamative/blistering drug rashes.
Doxycycline Pregnancy And Lactation Text: This medication is Pregnancy Category D and not consider safe during pregnancy. It is also excreted in breast milk but is considered safe for shorter treatment courses.
Aklief Pregnancy And Lactation Text: It is unknown if this medication is safe to use during pregnancy.  It is unknown if this medication is excreted in breast milk.  Breastfeeding women should use the topical cream on the smallest area of the skin for the shortest time needed while breastfeeding.  Do not apply to nipple and areola.
Tazorac Counseling:  Patient advised that medication is irritating and drying.  Patient may need to apply sparingly and wash off after an hour before eventually leaving it on overnight.  The patient verbalized understanding of the proper use and possible adverse effects of tazorac.  All of the patient's questions and concerns were addressed.